# Patient Record
Sex: MALE | Race: BLACK OR AFRICAN AMERICAN | NOT HISPANIC OR LATINO | Employment: FULL TIME | URBAN - METROPOLITAN AREA
[De-identification: names, ages, dates, MRNs, and addresses within clinical notes are randomized per-mention and may not be internally consistent; named-entity substitution may affect disease eponyms.]

---

## 2024-04-22 ENCOUNTER — HOSPITAL ENCOUNTER (EMERGENCY)
Facility: HOSPITAL | Age: 44
Discharge: HOME | End: 2024-04-22
Attending: EMERGENCY MEDICINE

## 2024-04-22 VITALS
DIASTOLIC BLOOD PRESSURE: 108 MMHG | TEMPERATURE: 99 F | HEIGHT: 67 IN | BODY MASS INDEX: 38.61 KG/M2 | SYSTOLIC BLOOD PRESSURE: 168 MMHG | RESPIRATION RATE: 18 BRPM | HEART RATE: 85 BPM | OXYGEN SATURATION: 97 % | WEIGHT: 246 LBS

## 2024-04-22 DIAGNOSIS — S61.211A LACERATION OF LEFT INDEX FINGER WITHOUT FOREIGN BODY WITHOUT DAMAGE TO NAIL, INITIAL ENCOUNTER: Primary | ICD-10-CM

## 2024-04-22 PROCEDURE — 99283 EMERGENCY DEPT VISIT LOW MDM: CPT | Mod: 25

## 2024-04-22 PROCEDURE — 12001 RPR S/N/AX/GEN/TRNK 2.5CM/<: CPT

## 2024-04-22 PROCEDURE — 25000003 PHARM REV CODE 250

## 2024-04-22 RX ORDER — LIDOCAINE HYDROCHLORIDE 10 MG/ML
5 INJECTION, SOLUTION EPIDURAL; INFILTRATION; INTRACAUDAL; PERINEURAL
Status: COMPLETED | OUTPATIENT
Start: 2024-04-22 | End: 2024-04-22

## 2024-04-22 RX ORDER — KETOROLAC TROMETHAMINE 30 MG/ML
60 INJECTION, SOLUTION INTRAMUSCULAR; INTRAVENOUS
Status: DISCONTINUED | OUTPATIENT
Start: 2024-04-22 | End: 2024-04-22 | Stop reason: HOSPADM

## 2024-04-22 RX ORDER — TRAMADOL HYDROCHLORIDE 50 MG/1
50 TABLET ORAL EVERY 6 HOURS PRN
Qty: 12 TABLET | Refills: 0 | Status: SHIPPED | OUTPATIENT
Start: 2024-04-22

## 2024-04-22 RX ADMIN — LIDOCAINE HYDROCHLORIDE 50 MG: 10 INJECTION, SOLUTION EPIDURAL; INFILTRATION; INTRACAUDAL; PERINEURAL at 01:04

## 2024-04-22 NOTE — Clinical Note
"Bayron"Margarita Mascorro was seen and treated in our emergency department on 4/22/2024.  He may return to work on 04/24/2024.       If you have any questions or concerns, please don't hesitate to call.      Leslee Avila PA"

## 2024-04-22 NOTE — FIRST PROVIDER EVALUATION
"Medical screening examination initiated.  I have conducted a focused provider triage encounter, findings are as follows:    Brief history of present illness:  Patient states laceration to his left index finger after he accidentally "smashed" it in a machine at work. States that his tetanus shot is UTD.     There were no vitals filed for this visit.    Pertinent physical exam:  Awake, alert, ambulatory    Brief workup plan:  Imaging    Preliminary workup initiated; this workup will be continued and followed by the physician or advanced practice provider that is assigned to the patient when roomed.  "

## 2024-04-22 NOTE — DISCHARGE INSTRUCTIONS
Remove dressing in 24-36 hours and gently wash with regular soap and water.  Pat dry following.    You may keep the area covered throughout the day and while you are at work.  Recommend changing of the dressing nightly.  You may leave open to air at night.      For pain, take tramadol as needed.  You may alternate this medication with over-the-counter ibuprofen.  You may also take Tylenol instead of tramadol.      Return to ER or seek medical care and 10 days for suture removal.    Continue to monitor for signs of infection.  If signs of infection do occur, please return to ER.      Return to ER as needed.

## 2024-04-22 NOTE — ED PROVIDER NOTES
Encounter Date: 4/22/2024       History     Chief Complaint   Patient presents with    Hand Pain     Pt to ed via pov with c/o L index finger laceration and pain since this morning. Reports smashing finger in work machine. Neuro intact. Bleeding controlled in triage. Last tDap 3 years ago.      See [facility] for details     The history is provided by the patient.     Review of patient's allergies indicates:   Allergen Reactions    Iodine      No past medical history on file.  No past surgical history on file.  No family history on file.     Review of Systems   Constitutional:  Negative for chills and fever.   Cardiovascular:  Negative for chest pain.   Gastrointestinal:  Negative for nausea and vomiting.   Skin:         Laceration   Neurological:  Negative for weakness and numbness.   All other systems reviewed and are negative.      Physical Exam     Initial Vitals [04/22/24 1038]   BP Pulse Resp Temp SpO2   (!) 162/108 85 20 98.7 °F (37.1 °C) 98 %      MAP       --         Physical Exam    Nursing note and vitals reviewed.  Constitutional: He appears well-developed and well-nourished. No distress.   HENT:   Head: Normocephalic and atraumatic.   Eyes: Conjunctivae and EOM are normal.   Cardiovascular:  Normal rate.           Pulmonary/Chest: No respiratory distress.   Musculoskeletal:         General: Normal range of motion.      Comments: Range of motion of left 2nd digit normal considering injury, range of motion with some mild discomfort     Neurological: He is alert and oriented to person, place, and time. He has normal strength. No sensory deficit. GCS score is 15. GCS eye subscore is 4. GCS verbal subscore is 5. GCS motor subscore is 6.   Skin: Skin is warm and dry. Capillary refill takes less than 2 seconds.        Approximally 2-1/2 cm laceration present to the plantar aspect of the left 2nd digit   Psychiatric: He has a normal mood and affect. Thought content normal.         ED Course   Lac Repair    Date/Time:  4/22/2024 2:51 PM    Performed by: Leslee Avila PA  Authorized by: Ariel Matute MD    Consent:     Consent obtained:  Verbal    Consent given by:  Patient    Risks, benefits, and alternatives were discussed: yes    Universal protocol:     Patient identity confirmed:  Verbally with patient  Anesthesia:     Anesthesia method:  Local infiltration (Digital block)    Local anesthetic:  Lidocaine 1% w/o epi  Laceration details:     Location:  Finger    Finger location:  L index finger    Length (cm):  2.5    Depth (mm):  5  Pre-procedure details:     Preparation:  Patient was prepped and draped in usual sterile fashion  Exploration:     Limited defect created (wound extended): no      Hemostasis achieved with:  Direct pressure    Imaging obtained: x-ray      Imaging outcome: foreign body not noted      Wound exploration: entire depth of wound visualized      Wound extent: no foreign bodies/material noted and no underlying fracture noted      Contaminated: no    Treatment:     Area cleansed with:  Saline    Amount of cleaning:  Extensive    Irrigation solution:  Sterile saline    Irrigation volume:  100    Irrigation method:  Syringe    Visualized foreign bodies/material removed: no      Debridement:  None    Undermining:  None    Scar revision: no    Skin repair:     Repair method:  Sutures    Suture size:  3-0    Suture material:  Prolene    Suture technique: Simple interrupted, horizontal mattress.    Number of sutures:  5  Approximation:     Approximation:  Close  Repair type:     Repair type:  Simple  Post-procedure details:     Dressing:  Non-adherent dressing    Procedure completion:  Tolerated    Labs Reviewed - No data to display       Imaging Results              X-Ray Finger 2 or More Views Left (Final result)  Result time 04/22/24 13:10:33      Final result by Diana Dove MD (04/22/24 13:10:33)                   Impression:      No acute bony abnormality.      Electronically signed by: Diana  "Petty  Date:    04/22/2024  Time:    13:10               Narrative:    EXAMINATION:  XR FINGER 2 OR MORE VIEWS LEFT    CLINICAL HISTORY:  Laceration, Finger Injury;    COMPARISON:  None.    FINDINGS:  There is no acute fracture or malalignment identified.  There is no radiopaque foreign body.                                       Medications   ketorolac injection 60 mg (60 mg Intramuscular Not Given 4/22/24 1400)   LIDOcaine (PF) 10 mg/ml (1%) injection 50 mg (50 mg Infiltration Given by Provider 4/22/24 1345)     Medical Decision Making  43-year-old male presents to the ER for evaluation of laceration to the left 2nd digit.  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  He reports that the machinery slipped and he cut his finger.  He reports that he is still able to move the finger, however, it is somewhat painful.  He denies any numbness, tingling, weakness.  He denies any blood thinner use.  Bleeding has been drilled with direct pressure.  He reports his tetanus was updated 3 years ago.    X-ray shows no acute bony abnormalities.  The patient does have a 2-1/2 cm laceration present to the left plantar aspect of the index finger.  It is overlying the proximal phalanges.  It did undergo primary repair well.  He has 4 simple interrupted sutures and 1 horizontal mattress located in the middle of the laceration.  Wound care instructions were reviewed with the patient prior to discharge.  Recommended he return to ER in about 10 days for suture removal.  We will discharge home with a short course of tramadol for the patient's pain relief.  He verbalizes understanding and was discharged home.    Amount and/or Complexity of Data Reviewed  Radiology:  Decision-making details documented in ED Course.    Risk  Prescription drug management.      Additional MDM:   Differential Diagnosis:   Other: The following " diagnoses were also considered and will be evaluated: Foreign body, Laceration involving tendon and Paresthesias.            ED Course as of 04/22/24 1457   Mon Apr 22, 2024   1308 X-ray left finger with no acute abnormalities [LM]      ED Course User Index  [LM] Leslee Avila PA                           Clinical Impression:  Final diagnoses:  [S61.211A] Laceration of left index finger without foreign body without damage to nail, initial encounter (Primary)          ED Disposition Condition    Discharge Stable          ED Prescriptions       Medication Sig Dispense Start Date End Date Auth. Provider    traMADoL (ULTRAM) 50 mg tablet Take 1 tablet (50 mg total) by mouth every 6 (six) hours as needed for Pain. 12 tablet 4/22/2024 -- Leslee Avila PA          Follow-up Information       Follow up With Specialties Details Why Contact Info    Samson Wall MD Family Medicine Schedule an appointment as soon as possible for a visit in 2 weeks As needed, If symptoms worsen [address]  [place] [address]  [phone]               Leslee Avila PA  04/22/24 8431

## 2024-05-02 ENCOUNTER — HOSPITAL ENCOUNTER (EMERGENCY)
Facility: HOSPITAL | Age: 44
Discharge: HOME | End: 2024-05-02
Attending: STUDENT IN AN ORGANIZED HEALTH CARE EDUCATION/TRAINING PROGRAM

## 2024-05-02 VITALS
WEIGHT: 250 LBS | OXYGEN SATURATION: 97 % | TEMPERATURE: 98 F | BODY MASS INDEX: 39.24 KG/M2 | HEIGHT: 67 IN | RESPIRATION RATE: 18 BRPM | DIASTOLIC BLOOD PRESSURE: 95 MMHG | HEART RATE: 88 BPM | SYSTOLIC BLOOD PRESSURE: 149 MMHG

## 2024-05-02 DIAGNOSIS — Z48.02 VISIT FOR SUTURE REMOVAL: Primary | ICD-10-CM

## 2024-05-02 PROCEDURE — 99281 EMR DPT VST MAYX REQ PHY/QHP: CPT

## 2024-05-02 NOTE — DISCHARGE INSTRUCTIONS
Keep the wound clean and dry other than the ointment prescribed to you.  During the day, wear the finger splint to make sure that you are keeping your finger straight for the next 3 days.  This is to help aid in healing and keep the tissues on the same path of healing.  You have been provided with the name of a hand specialist should you need it.

## 2024-05-02 NOTE — ED PROVIDER NOTES
Encounter Date: 5/2/2024       History     Chief Complaint   Patient presents with    Suture / Staple Removal     Suture removal to L pointer finger placed 4/22. Lac site appears healed. Denies redness/swelling/irritation/fevers. Requesting referral to hand specialist for further imaging to clear for work.      43-year-old male 10 days out from left 2nd digit finger laceration presents for suture removal.  Patient reports that he has not had any new trauma to the region.  He denies any fever or chills.  He denies any numbness tingling or weakness.    The history is provided by the patient. No  was used.   Suture / Staple Removal   The sutures were placed 7 to 10 days ago. Treatments since wound repair include antibiotic ointment use. There has been no drainage from the wound. There is no redness present. The pain has improved. There is difficulty moving the extremity or digit due to pain.     Review of patient's allergies indicates:   Allergen Reactions    Iodine      No past medical history on file.  No past surgical history on file.  No family history on file.     Review of Systems   Constitutional:  Negative for fever.   HENT:  Negative for sore throat.    Respiratory:  Negative for shortness of breath.    Cardiovascular:  Negative for chest pain.   Gastrointestinal:  Negative for nausea.   Genitourinary:  Negative for dysuria.   Musculoskeletal:  Negative for back pain.   Skin:  Negative for rash.   Neurological:  Negative for weakness.   Hematological:  Does not bruise/bleed easily.       Physical Exam     Initial Vitals [05/02/24 1715]   BP Pulse Resp Temp SpO2   (!) 149/95 88 18 97.9 °F (36.6 °C) 97 %      MAP       --         Physical Exam    Nursing note and vitals reviewed.  Constitutional: He appears well-developed and well-nourished.   HENT:   Head: Normocephalic and atraumatic.   Eyes: EOM are normal. Pupils are equal, round, and reactive to light.   Cardiovascular:  Normal rate,  regular rhythm and normal heart sounds.           Pulmonary/Chest: Breath sounds normal.   Abdominal: Abdomen is soft.   Musculoskeletal:         General: Normal range of motion.      Right hand: Normal.      Left hand: Laceration present.      Comments: Well-healing laceration noted to palmar surface of left 2nd digit.  There are sutures in place.  Good granulation tissue.  No active bleeding.  Patient is able to flex the digit.  Good distal pulses.     Neurological: He is alert and oriented to person, place, and time.   Skin: Skin is warm and dry.         ED Course   Suture Removal    Date/Time: 5/2/2024 5:31 PM  Location procedure was performed: [facility] EMERGENCY DEPARTMENT    Performed by: Breann Perkins PA-C  Authorized by: Breann Perkins PA-C  Assisting provider: Breann Perkins PA-C  Pre-operative diagnosis: Hand laceration  Post-operative diagnosis: Hand laceration  Body area: upper extremity  Location details: left index finger  Description of findings: Well-healing good granulation shoe.   Wound Appearance: clean  Sutures Removed: 5  Post-removal: dressing applied  Facility: sutures placed in this facility  Technical procedures used: none  Significant surgical tasks conducted by the assistant(s): none  Complications: No  Estimated blood loss (mL): 0  Specimens: No  Implants: No  Comments: Patient was placed in a finger splint to keep him from moving the digit over the next several days to make sure that the tissue was well granulated.        Labs Reviewed - No data to display       Imaging Results    None          Medications - No data to display  Medical Decision Making  43-year-old male presents for suture removal from left f 2nd finger.  He reports he has had some pain associated however is able to move the digit.  The sutures were placed approximately 10 days ago.  He denies any new trauma or bleeding.  Sutures were removed without incident.  No evidence of infection.  Have discussed with him that we  will put a finger splint during the day over the next several days to make sure that his finger has good granulation tissue.  We will have him follow up with primary care and/or hand specialist in the next week to make sure his symptoms have improved and he is able to return to work.  Patient voices understanding and agrees with the plan of care.                                      Clinical Impression:  Final diagnoses:  [Z48.02] Visit for suture removal (Primary)          ED Disposition Condition    Discharge Stable          ED Prescriptions    None       Follow-up Information       Follow up With Specialties Details Why Contact Info    Richie Castro Jr., MD Plastic Surgery Schedule an appointment as soon as possible for a visit  If symptoms worsen [address]  [address] [address]  [phone]               Breann Perkins, PA-C  05/02/24 5079

## 2024-05-02 NOTE — Clinical Note
"Bayron Wlech" Ludwin was seen and treated in our emergency department on 5/2/2024.  He may return to work on 05/06/2024.       If you have any questions or concerns, please don't hesitate to call.      Breann Perkins PA-C"